# Patient Record
Sex: MALE | Race: WHITE | Employment: UNEMPLOYED | URBAN - METROPOLITAN AREA
[De-identification: names, ages, dates, MRNs, and addresses within clinical notes are randomized per-mention and may not be internally consistent; named-entity substitution may affect disease eponyms.]

---

## 2023-08-15 ENCOUNTER — HOSPITAL ENCOUNTER (EMERGENCY)
Age: 23
Discharge: HOME | End: 2023-08-15
Attending: EMERGENCY MEDICINE

## 2023-08-15 VITALS
OXYGEN SATURATION: 97 % | SYSTOLIC BLOOD PRESSURE: 144 MMHG | WEIGHT: 220 LBS | DIASTOLIC BLOOD PRESSURE: 92 MMHG | HEART RATE: 83 BPM | HEIGHT: 68 IN | BODY MASS INDEX: 33.34 KG/M2 | TEMPERATURE: 97.8 F | RESPIRATION RATE: 20 BRPM

## 2023-08-15 DIAGNOSIS — R45.1 AGITATION: ICD-10-CM

## 2023-08-15 DIAGNOSIS — F15.90 AMPHETAMINE USE: ICD-10-CM

## 2023-08-15 DIAGNOSIS — F10.929 ACUTE ALCOHOLIC INTOXICATION WITH COMPLICATION (HCC): Primary | ICD-10-CM

## 2023-08-15 LAB
AMPHET UR QL SCN: POSITIVE
APPEARANCE UR: CLEAR
BARBITURATES UR QL SCN: NEGATIVE
BENZODIAZ UR QL: NEGATIVE
BILIRUB UR QL: NEGATIVE
CANNABINOIDS UR QL SCN: POSITIVE
COCAINE UR QL SCN: NEGATIVE
COLOR UR: NORMAL
GLUCOSE UR STRIP.AUTO-MCNC: NEGATIVE MG/DL
HGB UR QL STRIP: NEGATIVE
KETONES UR QL STRIP.AUTO: NEGATIVE MG/DL
LEUKOCYTE ESTERASE UR QL STRIP.AUTO: NEGATIVE
METHADONE UR QL: NEGATIVE
NITRITE UR QL STRIP.AUTO: NEGATIVE
OPIATES UR QL: NEGATIVE
PCP UR QL: NEGATIVE
PH UR STRIP: 5.5 (ref 5–9)
PROT UR STRIP-MCNC: NEGATIVE MG/DL
SP GR UR REFRACTOMETRY: 1.01 (ref 1–1.02)
UROBILINOGEN UR QL STRIP.AUTO: 0.2 EU/DL (ref 0.2–1)

## 2023-08-15 PROCEDURE — 81003 URINALYSIS AUTO W/O SCOPE: CPT

## 2023-08-15 PROCEDURE — 99283 EMERGENCY DEPT VISIT LOW MDM: CPT

## 2023-08-15 PROCEDURE — 80307 DRUG TEST PRSMV CHEM ANLYZR: CPT

## 2023-08-15 NOTE — ED NOTES
[facility] police called by security. Pt yelling, using racial slurs, cussing at staff and police. Pt given option to uber home or sleep in ER until able to drive. Pt remains combative, refusing to agree to stay in ER. Pt removed from ER by police. Pt in no distress, continues to use profanity as he left the ER.   MD aware of patient's removal.       Virgil Meckel, RN  08/15/23 0158
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
## 2023-08-15 NOTE — ED NOTES
Attempted to call two cell phone numbers for patient to obtain a ride, when asked whose numbers they were, patient replied \"me\". When called, both numbers were disconnected. Pt extremely agitated, using racial slurs, called this nurse a \"fucking bitch ass liberal\". Pt continues to call himself a patriot who is against \"[employer] secret police\". Pt pacing in room and outside into hallway. Security outside room. Again attempted to encourage patient to rest until morning, patient continues to deny being drunk, demanding an uber to his truck.      Jacy Pendleton RN  08/15/23 2182

## 2023-08-15 NOTE — ED NOTES
Pt found wandering the hallway, walking into other patients rooms. Attempted to reorient patient to time and place, escorted patient back to room 10. Attempted to draw blood from patient, patient refusing, states he has a primary care physician and all his \"labs are just fine\". Dr Susanne Osman aware.      Vadim Ricci, RN  08/15/23 015

## 2023-08-16 ASSESSMENT — ENCOUNTER SYMPTOMS
COUGH: 0
BACK PAIN: 0
VOMITING: 0
ABDOMINAL PAIN: 0
SHORTNESS OF BREATH: 0
NAUSEA: 0